# Patient Record
Sex: FEMALE | Race: WHITE | NOT HISPANIC OR LATINO | URBAN - METROPOLITAN AREA
[De-identification: names, ages, dates, MRNs, and addresses within clinical notes are randomized per-mention and may not be internally consistent; named-entity substitution may affect disease eponyms.]

---

## 2025-06-10 ENCOUNTER — OFFICE VISIT (OUTPATIENT)
Dept: URGENT CARE | Age: 45
End: 2025-06-10
Payer: OTHER GOVERNMENT

## 2025-06-10 VITALS
HEART RATE: 90 BPM | WEIGHT: 216 LBS | OXYGEN SATURATION: 93 % | BODY MASS INDEX: 39.51 KG/M2 | DIASTOLIC BLOOD PRESSURE: 83 MMHG | RESPIRATION RATE: 16 BRPM | SYSTOLIC BLOOD PRESSURE: 123 MMHG | TEMPERATURE: 97.9 F

## 2025-06-10 DIAGNOSIS — N39.0 URINARY TRACT INFECTION WITHOUT HEMATURIA, SITE UNSPECIFIED: Primary | ICD-10-CM

## 2025-06-10 DIAGNOSIS — R39.15 URGENCY OF URINATION: ICD-10-CM

## 2025-06-10 DIAGNOSIS — R35.0 FREQUENCY OF URINATION: ICD-10-CM

## 2025-06-10 DIAGNOSIS — R39.9 URINARY TRACT INFECTION SYMPTOMS: ICD-10-CM

## 2025-06-10 DIAGNOSIS — R10.31 RLQ ABDOMINAL PAIN: ICD-10-CM

## 2025-06-10 PROBLEM — R10.30 ABDOMINAL PAIN, LOWER: Status: ACTIVE | Noted: 2025-06-10

## 2025-06-10 PROBLEM — R51.9 CEPHALGIA: Status: ACTIVE | Noted: 2025-06-10

## 2025-06-10 PROBLEM — R09.81 HEAD CONGESTION: Status: ACTIVE | Noted: 2025-06-10

## 2025-06-10 PROBLEM — J06.9 UPPER RESPIRATORY INFECTION, ACUTE: Status: ACTIVE | Noted: 2025-06-10

## 2025-06-10 PROBLEM — R42 VERTIGO: Status: ACTIVE | Noted: 2025-06-10

## 2025-06-10 PROBLEM — M79.673 FOOT PAIN: Status: ACTIVE | Noted: 2025-06-10

## 2025-06-10 PROBLEM — J01.10 ACUTE FRONTAL SINUSITIS: Status: ACTIVE | Noted: 2025-06-10

## 2025-06-10 LAB
POC APPEARANCE, URINE: CLEAR
POC BILIRUBIN, URINE: ABNORMAL
POC BLOOD, URINE: NEGATIVE
POC COLOR, URINE: YELLOW
POC GLUCOSE, URINE: NEGATIVE MG/DL
POC KETONES, URINE: NEGATIVE MG/DL
POC LEUKOCYTES, URINE: ABNORMAL
POC NITRITE,URINE: POSITIVE
POC PH, URINE: 5.5 PH
POC PROTEIN, URINE: NEGATIVE MG/DL
POC SPECIFIC GRAVITY, URINE: 1.01
POC UROBILINOGEN, URINE: 4 EU/DL
PREGNANCY TEST URINE, POC: NEGATIVE

## 2025-06-10 RX ORDER — CEFDINIR 300 MG/1
1 CAPSULE ORAL
COMMUNITY
Start: 2025-01-15

## 2025-06-10 RX ORDER — TIZANIDINE 4 MG/1
4 TABLET ORAL EVERY 8 HOURS PRN
COMMUNITY
Start: 2024-12-30 | End: 2025-06-10 | Stop reason: ALTCHOICE

## 2025-06-10 RX ORDER — SUCRALFATE 1 G/1
1 TABLET ORAL 3 TIMES DAILY
COMMUNITY
Start: 2025-05-22

## 2025-06-10 RX ORDER — ACARBOSE 25 MG/1
25 TABLET ORAL
COMMUNITY
Start: 2025-05-12 | End: 2026-05-12

## 2025-06-10 RX ORDER — ZINC SULFATE 50(220)MG
1 CAPSULE ORAL
COMMUNITY
Start: 2025-05-27

## 2025-06-10 RX ORDER — ASTRAGALUS ROOT 470 MG
CAPSULE ORAL
COMMUNITY
Start: 2025-05-20

## 2025-06-10 RX ORDER — OMEPRAZOLE 40 MG/1
40 CAPSULE, DELAYED RELEASE ORAL 2 TIMES DAILY
COMMUNITY

## 2025-06-10 RX ORDER — PANTOPRAZOLE SODIUM 40 MG/1
40 TABLET, DELAYED RELEASE ORAL
COMMUNITY
Start: 2025-05-22

## 2025-06-10 RX ORDER — CIPROFLOXACIN 500 MG/1
1 TABLET, FILM COATED ORAL
COMMUNITY
Start: 2025-02-27 | End: 2025-06-10 | Stop reason: ALTCHOICE

## 2025-06-10 RX ORDER — CIPROFLOXACIN 500 MG/1
500 TABLET, FILM COATED ORAL
Qty: 6 TABLET | Refills: 0 | Status: SHIPPED | OUTPATIENT
Start: 2025-06-10 | End: 2025-06-13

## 2025-06-10 RX ORDER — NORTRIPTYLINE HYDROCHLORIDE 25 MG/1
75 CAPSULE ORAL
COMMUNITY
Start: 2024-12-17 | End: 2025-12-12

## 2025-06-10 RX ORDER — DICYCLOMINE HYDROCHLORIDE 10 MG/1
10 CAPSULE ORAL 4 TIMES DAILY
COMMUNITY
Start: 2025-02-05 | End: 2026-02-05

## 2025-06-10 RX ORDER — LEVOTHYROXINE SODIUM 175 UG/1
175 TABLET ORAL
COMMUNITY
Start: 2024-12-17

## 2025-06-10 RX ORDER — PSEUDOEPHEDRINE HCL 30 MG
500 TABLET ORAL
COMMUNITY
Start: 2025-04-30

## 2025-06-10 RX ORDER — METFORMIN HYDROCHLORIDE 500 MG/1
TABLET ORAL
COMMUNITY

## 2025-06-10 RX ORDER — BUPROPION HYDROCHLORIDE 100 MG/1
100 TABLET ORAL 2 TIMES DAILY
COMMUNITY
Start: 2025-04-30 | End: 2026-04-30

## 2025-06-10 RX ORDER — FLUCONAZOLE 150 MG/1
TABLET ORAL
COMMUNITY
Start: 2025-06-06 | End: 2025-06-10 | Stop reason: ALTCHOICE

## 2025-06-10 RX ORDER — HEPARIN SODIUM 5000 [USP'U]/.5ML
5000 INJECTION, SOLUTION INTRAVENOUS; SUBCUTANEOUS
COMMUNITY
Start: 2025-04-29

## 2025-06-10 RX ORDER — LISINOPRIL 20 MG/1
TABLET ORAL
COMMUNITY

## 2025-06-10 RX ORDER — ESCITALOPRAM OXALATE 10 MG/1
10 TABLET ORAL DAILY
COMMUNITY

## 2025-06-10 ASSESSMENT — ENCOUNTER SYMPTOMS
NAUSEA: 0
CONSTITUTIONAL NEGATIVE: 1
DYSURIA: 0
ABDOMINAL DISTENTION: 0
CARDIOVASCULAR NEGATIVE: 1
BACK PAIN: 1
NEUROLOGICAL NEGATIVE: 1
RESPIRATORY NEGATIVE: 1
ANAL BLEEDING: 0
PSYCHIATRIC NEGATIVE: 1
EYES NEGATIVE: 1
FLANK PAIN: 1
FEVER: 0
HEMATURIA: 0
FREQUENCY: 1
CONSTIPATION: 0
BLOOD IN STOOL: 0
ABDOMINAL PAIN: 1
DIARRHEA: 0
VOMITING: 0

## 2025-06-10 NOTE — PROGRESS NOTES
Subjective   Patient ID: Barbara Akbar is a 44 y.o. female. They present today with a chief complaint of Urinary Problem.    History of Present Illness  H/o thyroid CA, cholecystectomy. C/o urinary x7 days and RLQ abdominal pain s/s x 2-3 day(s). Has tried OTC meds with mild relief. Denies any CP, SOB, HA, fever, abdominal pain, and nausea/vomiting otherwise.      History provided by:  Patient      Past Medical History  Allergies as of 06/10/2025 - Reviewed 06/10/2025   Allergen Reaction Noted    Amoxicillin Other 06/10/2025    Nitrofurantoin monohyd/m-cryst Other 06/10/2025    Sulfamethoxazole Other 06/10/2025       Prescriptions Prior to Admission[1]     Medical History[2]    Surgical History[3]     reports that she has never smoked. She does not have any smokeless tobacco history on file. She reports that she does not use drugs.    Review of Systems  Review of Systems   Constitutional: Negative.  Negative for fever.   HENT: Negative.     Eyes: Negative.    Respiratory: Negative.     Cardiovascular: Negative.    Gastrointestinal:  Positive for abdominal pain. Negative for abdominal distention, anal bleeding, blood in stool, constipation, diarrhea, nausea and vomiting.   Genitourinary:  Positive for flank pain, frequency and urgency. Negative for dysuria, hematuria, vaginal bleeding, vaginal discharge and vaginal pain.   Musculoskeletal:  Positive for back pain.   Skin: Negative.    Neurological: Negative.    Psychiatric/Behavioral: Negative.     All other systems reviewed and are negative.                                 Objective    Vitals:    06/10/25 1722   BP: 123/83   Pulse: 90   Resp: 16   Temp: 36.6 °C (97.9 °F)   SpO2: 93%   Weight: 98 kg (216 lb)     No LMP recorded.    Physical Exam  Vitals and nursing note reviewed.   Constitutional:       General: She is not in acute distress.     Appearance: Normal appearance. She is not ill-appearing, toxic-appearing or diaphoretic.   HENT:      Head:  Normocephalic and atraumatic.      Nose: Nose normal.      Mouth/Throat:      Mouth: Mucous membranes are moist.      Pharynx: Oropharynx is clear.   Eyes:      Extraocular Movements: Extraocular movements intact.      Pupils: Pupils are equal, round, and reactive to light.   Cardiovascular:      Rate and Rhythm: Normal rate and regular rhythm.      Pulses: Normal pulses.      Heart sounds: Normal heart sounds.   Pulmonary:      Effort: Pulmonary effort is normal. No respiratory distress.      Breath sounds: Normal breath sounds. No rhonchi.   Abdominal:      General: Abdomen is flat. Bowel sounds are normal. There is no distension. There are no signs of injury.      Palpations: Abdomen is soft. There is no hepatomegaly, splenomegaly or mass.      Tenderness: There is abdominal tenderness in the right lower quadrant. There is right CVA tenderness and rebound. There is no left CVA tenderness or guarding. Positive signs include McBurney's sign. Negative signs include Maloney's sign, Rovsing's sign, psoas sign and obturator sign.      Hernia: No hernia is present.   Skin:     General: Skin is warm and dry.   Neurological:      Mental Status: She is alert and oriented to person, place, and time.   Psychiatric:         Mood and Affect: Mood normal.         Behavior: Behavior normal.         Procedures    Point of Care Test & Imaging Results from this visit  Results for orders placed or performed in visit on 06/10/25   POCT UA Automated manually resulted   Result Value Ref Range    POC Color, Urine Yellow Straw, Yellow, Light-Yellow    POC Appearance, Urine Clear Clear    POC Glucose, Urine NEGATIVE NEGATIVE mg/dl    POC Bilirubin, Urine LARGE (3+) (A) NEGATIVE    POC Ketones, Urine NEGATIVE NEGATIVE mg/dl    POC Specific Gravity, Urine 1.015 1.005 - 1.035    POC Blood, Urine NEGATIVE NEGATIVE    POC PH, Urine 5.5 No Reference Range Established PH    POC Protein, Urine NEGATIVE NEGATIVE mg/dl    POC Urobilinogen, Urine 4.0  (A) 0.2, 1.0 EU/DL    Poc Nitrite, Urine POSITIVE (A) NEGATIVE    POC Leukocytes, Urine MODERATE (2+) (A) NEGATIVE   POCT pregnancy, urine manually resulted   Result Value Ref Range    Preg Test, Ur Negative Negative      Imaging  No results found.    Cardiology, Vascular, and Other Imaging  No other imaging results found for the past 2 days      Diagnostic study results (if any) were reviewed by BROCK Barrera.    Assessment/Plan   Allergies, medications, history, and pertinent labs/EKGs/Imaging reviewed by BROCK Barrera.     Medical Decision Making  UA pos for UTI. Sending cipro and culture. On exam, RLQ pain with rebound, pos R CVA pain. D/t presentation, recommending ED for further evaluation/higher level of care. States she will go to [facility] ED for eval. Patient signed AMA form to self-transport. Patient verbalized understanding and agreed with the plan of care.      Orders and Diagnoses  Diagnoses and all orders for this visit:  Urinary tract infection without hematuria, site unspecified  -     ciprofloxacin (Cipro) 500 mg tablet; Take 1 tablet (500 mg) by mouth every 12 hours for 3 days.  -     Urine Culture  Frequency of urination  Urgency of urination  RLQ abdominal pain  Urinary tract infection symptoms  -     POCT UA Automated manually resulted  -     POCT pregnancy, urine manually resulted      Medical Admin Record      Patient disposition: ED    Electronically signed by BROCK Barrera  6:19 PM       [1] (Not in a hospital admission)   [2] History reviewed. No pertinent past medical history.  [3] History reviewed. No pertinent surgical history.

## 2025-06-12 LAB — BACTERIA UR CULT: NORMAL
